# Patient Record
Sex: FEMALE | Race: WHITE | NOT HISPANIC OR LATINO | URBAN - METROPOLITAN AREA
[De-identification: names, ages, dates, MRNs, and addresses within clinical notes are randomized per-mention and may not be internally consistent; named-entity substitution may affect disease eponyms.]

---

## 2018-07-26 ENCOUNTER — OFFICE VISIT (OUTPATIENT)
Dept: URBAN - METROPOLITAN AREA CLINIC 76 | Facility: CLINIC | Age: 68
End: 2018-07-26
Payer: MEDICARE

## 2018-07-26 PROCEDURE — 99213 OFFICE O/P EST LOW 20 MIN: CPT | Performed by: OPTOMETRIST

## 2018-07-26 ASSESSMENT — INTRAOCULAR PRESSURE
OS: 9
OD: 10

## 2018-11-15 ENCOUNTER — OFFICE VISIT (OUTPATIENT)
Dept: URBAN - METROPOLITAN AREA CLINIC 76 | Facility: CLINIC | Age: 68
End: 2018-11-15
Payer: MEDICARE

## 2018-11-15 DIAGNOSIS — H40.1132 PRIMARY OPEN-ANGLE GLAUCOMA, BILATERAL, MODERATE STAGE: Primary | ICD-10-CM

## 2018-11-15 DIAGNOSIS — H35.3131 BILATERAL NONEXUDATIVE AGE-RELATED MACULAR DEGENERATION, EARLY DRY STAGE: ICD-10-CM

## 2018-11-15 PROCEDURE — 92014 COMPRE OPH EXAM EST PT 1/>: CPT | Performed by: OPTOMETRIST

## 2018-11-15 PROCEDURE — 92133 CPTRZD OPH DX IMG PST SGM ON: CPT | Performed by: OPTOMETRIST

## 2018-11-15 PROCEDURE — 92083 EXTENDED VISUAL FIELD XM: CPT | Performed by: OPTOMETRIST

## 2018-11-15 ASSESSMENT — INTRAOCULAR PRESSURE
OD: 14
OS: 11

## 2018-11-15 NOTE — IMPRESSION/PLAN
Impression: Primary open-angle glaucoma, bilateral, moderate stage: Q06.2333. IOP controlled OU. OD borderline. OCT 11/15/18: stable OU. VF 11/15/18: stable OD, mildly worse OS. Plan: Discussed. Continue Latanoprost QHS OU. Repeat VF/OCT 11/2019.

## 2018-11-15 NOTE — IMPRESSION/PLAN
Impression: Bilateral nonexudative age-related macular degeneration, early dry stage: H35.3131. just started AREDS Plan: Discussed Amsler and AREDS. Gave/explained Amsler Grid. Continue to monitor. Call if vision worsens.

## 2019-08-27 ENCOUNTER — OFFICE VISIT (OUTPATIENT)
Dept: URBAN - METROPOLITAN AREA CLINIC 76 | Facility: CLINIC | Age: 69
End: 2019-08-27
Payer: MEDICARE

## 2019-08-27 DIAGNOSIS — G43.B0 OPHTHALMOPLEGIC MIGRAINE, NOT INTRACTABLE: ICD-10-CM

## 2019-08-27 PROCEDURE — 92014 COMPRE OPH EXAM EST PT 1/>: CPT | Performed by: OPTOMETRIST

## 2019-08-27 PROCEDURE — 92134 CPTRZ OPH DX IMG PST SGM RTA: CPT | Performed by: OPTOMETRIST

## 2019-08-27 ASSESSMENT — INTRAOCULAR PRESSURE
OD: 13
OS: 10

## 2019-08-27 NOTE — IMPRESSION/PLAN
Impression: Ophthalmoplegic migraine, not intractable: G43. B0. hx of. Plan: Continue to monitor. Call if worsens in any way. Advise pt have blood pressures and blood sugars monitored if doesn't already know status.

## 2019-08-27 NOTE — IMPRESSION/PLAN
Impression: Primary open-angle glaucoma, bilateral, moderate stage: W16.9059. IOP controlled OU. OD borderline. OCT 11/15/18: stable OU. VF 11/15/18: stable OD, mildly worse OS. Plan: Discussed. Continue Latanoprost QHS OU. Needs updated VF/OCT.

## 2019-08-27 NOTE — IMPRESSION/PLAN
Impression: Bilateral nonexudative age-related macular degeneration, early dry stage: H35.3131. stable. Plan: Discussed condition in detail. AMD and AREDS education given. Amsler grid given and explained how and when to use. Call if symptoms worsen. Will continue to monitor. Yearly mac OCT or sooner PRN. Next due 8/2020.

## 2020-02-20 ENCOUNTER — OFFICE VISIT (OUTPATIENT)
Dept: URBAN - METROPOLITAN AREA CLINIC 76 | Facility: CLINIC | Age: 70
End: 2020-02-20
Payer: MEDICARE

## 2020-02-20 PROCEDURE — 92133 CPTRZD OPH DX IMG PST SGM ON: CPT | Performed by: OPTOMETRIST

## 2020-02-20 PROCEDURE — 92083 EXTENDED VISUAL FIELD XM: CPT | Performed by: OPTOMETRIST

## 2020-02-20 PROCEDURE — 99213 OFFICE O/P EST LOW 20 MIN: CPT | Performed by: OPTOMETRIST

## 2020-02-20 ASSESSMENT — KERATOMETRY
OD: 46.88
OS: 46.50

## 2020-02-20 ASSESSMENT — INTRAOCULAR PRESSURE
OS: 11
OD: 13

## 2020-02-20 NOTE — IMPRESSION/PLAN
Impression: Primary open-angle glaucoma, bilateral, moderate stage: J03.9901. IOP controlled OU. OD borderline. OCT 2/20/20: stable OU. VF 2/20/20: stable OD, sup trinidad mildly worse and inf step fluctuating OS. Hx of multiple episodes of optic neuritis since age 19: unknown eye. Pt didn't return when advised. Plan: Discussed.   Continue Latanoprost QHS OU.  VF/OCT 2/2021

## 2020-06-17 ENCOUNTER — OFFICE VISIT (OUTPATIENT)
Dept: URBAN - METROPOLITAN AREA CLINIC 76 | Facility: CLINIC | Age: 70
End: 2020-06-17
Payer: MEDICARE

## 2020-06-17 PROCEDURE — 99213 OFFICE O/P EST LOW 20 MIN: CPT | Performed by: OPTOMETRIST

## 2020-06-17 ASSESSMENT — INTRAOCULAR PRESSURE
OD: 13
OS: 11

## 2020-06-17 NOTE — IMPRESSION/PLAN
Impression: Primary open-angle glaucoma, bilateral, moderate stage: P65.0739. IOP controlled OU. OD borderline. Target IOP mid-teens. OCT 2/20/20: stable OU. VF 2/20/20: stable OD, sup trinidad mildly worse and inf step fluctuating OS. Hx of multiple episodes of optic neuritis since age 19: unknown eye. Plan: Discussed.   Continue Latanoprost QHS OU.  VF/OCT 2/2021

## 2021-01-19 ENCOUNTER — OFFICE VISIT (OUTPATIENT)
Dept: URBAN - METROPOLITAN AREA CLINIC 76 | Facility: CLINIC | Age: 71
End: 2021-01-19
Payer: MEDICARE

## 2021-01-19 PROCEDURE — 99213 OFFICE O/P EST LOW 20 MIN: CPT | Performed by: OPTOMETRIST

## 2021-01-19 ASSESSMENT — INTRAOCULAR PRESSURE
OS: 11
OD: 12

## 2021-01-19 NOTE — IMPRESSION/PLAN
Impression: Primary open-angle glaucoma, bilateral, moderate stage: P16.3254. IOP controlled OU. OD borderline. Target IOP mid-teens. OCT 2/20/20: stable OU. VF 2/20/20: stable OD, sup trinidad mildly worse and inf step fluctuating OS. Hx of multiple episodes of optic neuritis since age 19: unknown eye. IOP good today OU. Pt did not return when advised. Plan: Discussed.   Continue Latanoprost QHS OU.  VF/OCT 4/2021

## 2021-04-07 ENCOUNTER — OFFICE VISIT (OUTPATIENT)
Dept: URBAN - METROPOLITAN AREA CLINIC 76 | Facility: CLINIC | Age: 71
End: 2021-04-07
Payer: MEDICARE

## 2021-04-07 PROCEDURE — 92083 EXTENDED VISUAL FIELD XM: CPT | Performed by: OPTOMETRIST

## 2021-04-07 PROCEDURE — 92133 CPTRZD OPH DX IMG PST SGM ON: CPT | Performed by: OPTOMETRIST

## 2021-04-07 PROCEDURE — 92014 COMPRE OPH EXAM EST PT 1/>: CPT | Performed by: OPTOMETRIST

## 2021-04-07 ASSESSMENT — INTRAOCULAR PRESSURE
OS: 12
OD: 11

## 2021-04-07 NOTE — IMPRESSION/PLAN
Impression: Bilateral nonexudative age-related macular degeneration, early dry stage: H35.3131. stable. Plan: Discussed condition. AMD/AREDS/Amsler grid reviewed. Call if symptoms worsen. Will continue to monitor. Recommended Mac OCT at next IOP check.

## 2021-04-07 NOTE — IMPRESSION/PLAN
Impression: Primary open-angle glaucoma, bilateral, moderate stage: G41.6896. IOP controlled OU. Target IOP mid-teens. OCT 4/7/21: stable OU. VF 4/7/21: Nasal worse OD, nasal defect stable OS. Hx of multiple episodes of optic neuritis since age 19: unknown eye. IOP good today OU. *Pt was upset about today's wait time. Plan: Discussed.   Continue Latanoprost QHS OU.  VF/OCT 4/2022

## 2021-09-01 ENCOUNTER — OFFICE VISIT (OUTPATIENT)
Dept: URBAN - METROPOLITAN AREA CLINIC 76 | Facility: CLINIC | Age: 71
End: 2021-09-01
Payer: MEDICARE

## 2021-09-01 DIAGNOSIS — Z96.1 PRESENCE OF INTRAOCULAR LENS: ICD-10-CM

## 2021-09-01 PROCEDURE — 92134 CPTRZ OPH DX IMG PST SGM RTA: CPT | Performed by: OPTOMETRIST

## 2021-09-01 PROCEDURE — 99213 OFFICE O/P EST LOW 20 MIN: CPT | Performed by: OPTOMETRIST

## 2021-09-01 ASSESSMENT — INTRAOCULAR PRESSURE
OS: 10
OD: 10

## 2021-09-01 NOTE — IMPRESSION/PLAN
Impression: Bilateral nonexudative age-related macular degeneration, early dry stage: H35.3131. stable. Mac OCT 9/1/21: No SRF/IRF OU. Plan: Discussed condition. AMD/AREDS/Amsler grid reviewed. Call if symptoms worsen. Will continue to monitor. Recommended repeat Mac OCT yearly.

## 2021-09-01 NOTE — IMPRESSION/PLAN
Impression: Primary open-angle glaucoma, bilateral, moderate stage: U71.2138. IOP controlled OU. Target IOP mid-teens. OCT 4/7/21: stable OU. VF 4/7/21: Nasal worse OD, nasal defect stable OS. Hx of multiple episodes of optic neuritis since age 19: unknown eye. IOP good today OU. Plan: Discussed. Continue Latanoprost QHS OU.  repeat VF/OCT 4/2022, intraocular pressure stable OU.

## 2021-11-04 NOTE — IMPRESSION/PLAN
Impression: Primary open-angle glaucoma, bilateral, moderate stage: A95.0719. IOP controlled OU Plan: Discussed. Continue Latanoprost QHS OU. Repeat VF/OCT 11/2019. Patient made aware of 24/7 emergency services.

## 2022-01-18 ENCOUNTER — OFFICE VISIT (OUTPATIENT)
Dept: URBAN - METROPOLITAN AREA CLINIC 76 | Facility: CLINIC | Age: 72
End: 2022-01-18
Payer: MEDICARE

## 2022-01-18 PROCEDURE — 99213 OFFICE O/P EST LOW 20 MIN: CPT | Performed by: OPTOMETRIST

## 2022-01-18 ASSESSMENT — INTRAOCULAR PRESSURE
OD: 11
OS: 11

## 2022-01-18 NOTE — IMPRESSION/PLAN
Impression: Primary open-angle glaucoma, bilateral, moderate stage: I58.3360. Target IOP mid-teens. OCT 4/7/21: stable OU. VF 4/7/21: Nasal worse OD, nasal defect stable OS. Hx of multiple episodes of optic neuritis since age 19: unknown eye. IOP good today OU. Plan: Discussed. Continue Latanoprost QHS OU.  repeat VF/OCT 4/2022.

## 2022-05-04 ENCOUNTER — OFFICE VISIT (OUTPATIENT)
Dept: URBAN - METROPOLITAN AREA CLINIC 76 | Facility: CLINIC | Age: 72
End: 2022-05-04
Payer: MEDICARE

## 2022-05-04 DIAGNOSIS — Z96.1 PRESENCE OF INTRAOCULAR LENS: ICD-10-CM

## 2022-05-04 DIAGNOSIS — H52.4 PRESBYOPIA: ICD-10-CM

## 2022-05-04 DIAGNOSIS — H35.3131 BILATERAL NONEXUDATIVE AGE-RELATED MACULAR DEGENERATION, EARLY DRY STAGE: ICD-10-CM

## 2022-05-04 DIAGNOSIS — H40.1132 PRIMARY OPEN-ANGLE GLAUCOMA, BILATERAL, MODERATE STAGE: Primary | ICD-10-CM

## 2022-05-04 PROCEDURE — 92083 EXTENDED VISUAL FIELD XM: CPT | Performed by: OPTOMETRIST

## 2022-05-04 PROCEDURE — 99214 OFFICE O/P EST MOD 30 MIN: CPT | Performed by: OPTOMETRIST

## 2022-05-04 PROCEDURE — 92133 CPTRZD OPH DX IMG PST SGM ON: CPT | Performed by: OPTOMETRIST

## 2022-05-04 ASSESSMENT — INTRAOCULAR PRESSURE
OS: 9
OD: 11

## 2022-05-04 NOTE — IMPRESSION/PLAN
Impression: Presbyopia: H52.4. Plan: Pt needing new gls but waiting until she can afford them. Advised vision exam cannot be done on medical visits. Pt understands.

## 2022-05-04 NOTE — IMPRESSION/PLAN
Impression: Primary open-angle glaucoma, bilateral, moderate stage: Y34.8084. Target IOP mid-teens. OCT 05/04/22: thinning stable OU. VF 05/04/22: nasal defect stable OU. Hx of multiple episodes of optic neuritis since age 19: unknown eye. IOP good today OU. Plan: Discussed condition. Continue Latanoprost QHS OU. OK to switch to q6 mos visits. Repeat VF/OCT 05/2023.

## 2022-05-04 NOTE — IMPRESSION/PLAN
Impression: Bilateral nonexudative age-related macular degeneration, early dry stage: H35.3131. stable. Mac OCT 9/1/21: No SRF/IRF OU. Plan: Discussed condition. AMD/AREDS/Amsler grid reviewed. Call if symptoms worsen. Will continue to monitor. Recommended repeat Mac OCT 9/2022.

## 2022-11-01 ENCOUNTER — OFFICE VISIT (OUTPATIENT)
Dept: URBAN - METROPOLITAN AREA CLINIC 76 | Facility: CLINIC | Age: 72
End: 2022-11-01
Payer: MEDICARE

## 2022-11-01 DIAGNOSIS — H35.3131 NONEXUDATIVE AGE-RELATED MACULAR DEGENERATION, BILATERAL, EARLY DRY STAGE: Primary | ICD-10-CM

## 2022-11-01 DIAGNOSIS — Z96.1 PRESENCE OF INTRAOCULAR LENS: ICD-10-CM

## 2022-11-01 DIAGNOSIS — H40.1132 PRIMARY OPEN-ANGLE GLAUCOMA, BILATERAL, MODERATE STAGE: ICD-10-CM

## 2022-11-01 DIAGNOSIS — H10.45 OTHER CHRONIC ALLERGIC CONJUNCTIVITIS: ICD-10-CM

## 2022-11-01 PROCEDURE — 99213 OFFICE O/P EST LOW 20 MIN: CPT | Performed by: OPTOMETRIST

## 2022-11-01 PROCEDURE — 92134 CPTRZ OPH DX IMG PST SGM RTA: CPT | Performed by: OPTOMETRIST

## 2022-11-01 ASSESSMENT — INTRAOCULAR PRESSURE
OD: 14
OS: 12

## 2022-11-01 NOTE — IMPRESSION/PLAN
Impression: Bilateral nonexudative age-related macular degeneration, early dry stage: H35.3131. stable. Mac OCT 11/1/22: No SRF/IRF OU. Taking AREDS. Plan: Discussed condition. AMD/AREDS/Amsler grid reviewed. Call if symptoms worsen. Will continue to monitor.  Recommended repeat Mac OCT 11/2023

## 2022-11-01 NOTE — IMPRESSION/PLAN
Impression: Primary open-angle glaucoma, bilateral, moderate stage: F22.3234. Target IOP mid-teens. OCT 05/04/22: thinning stable OU. VF 05/04/22: nasal defect stable OU. Hx of multiple episodes of optic neuritis since age 19: unknown eye. IOP good today OU. Plan: Discussed condition. Continue Latanoprost QHS OU. Continue q6 mos visits. Repeat VF/OCT 05/2023.

## 2022-11-01 NOTE — IMPRESSION/PLAN
Impression: Other chronic allergic conjunctivitis: H10.45. Plan: Discussed. Advise the use of Ketotifen/Olopatadine/Alcaftadine QD-BID OU PRN. Call if symptoms do not resolve or if worsens.

## 2023-08-02 ENCOUNTER — OFFICE VISIT (OUTPATIENT)
Dept: URBAN - METROPOLITAN AREA CLINIC 76 | Facility: CLINIC | Age: 73
End: 2023-08-02
Payer: MEDICARE

## 2023-08-02 DIAGNOSIS — H40.1132 PRIMARY OPEN-ANGLE GLAUCOMA, BILATERAL, MODERATE STAGE: Primary | ICD-10-CM

## 2023-08-02 DIAGNOSIS — Z96.1 PRESENCE OF INTRAOCULAR LENS: ICD-10-CM

## 2023-08-02 DIAGNOSIS — H52.4 PRESBYOPIA: ICD-10-CM

## 2023-08-02 DIAGNOSIS — H53.2 DIPLOPIA: ICD-10-CM

## 2023-08-02 DIAGNOSIS — H35.3131 BILATERAL NONEXUDATIVE AGE-RELATED MACULAR DEGENERATION, EARLY DRY STAGE: ICD-10-CM

## 2023-08-02 PROCEDURE — 92133 CPTRZD OPH DX IMG PST SGM ON: CPT | Performed by: OPTOMETRIST

## 2023-08-02 PROCEDURE — 92083 EXTENDED VISUAL FIELD XM: CPT | Performed by: OPTOMETRIST

## 2023-08-02 PROCEDURE — 92014 COMPRE OPH EXAM EST PT 1/>: CPT | Performed by: OPTOMETRIST

## 2023-08-02 ASSESSMENT — KERATOMETRY
OS: 46.63
OD: 46.13

## 2023-08-02 ASSESSMENT — INTRAOCULAR PRESSURE
OS: 8
OD: 9

## 2024-02-06 ENCOUNTER — OFFICE VISIT (OUTPATIENT)
Dept: URBAN - METROPOLITAN AREA CLINIC 76 | Facility: CLINIC | Age: 74
End: 2024-02-06
Payer: MEDICARE

## 2024-02-06 DIAGNOSIS — H35.3131 BILATERAL NONEXUDATIVE AGE-RELATED MACULAR DEGENERATION, EARLY DRY STAGE: Primary | ICD-10-CM

## 2024-02-06 DIAGNOSIS — H40.1132 PRIMARY OPEN-ANGLE GLAUCOMA, BILATERAL, MODERATE STAGE: ICD-10-CM

## 2024-02-06 PROCEDURE — 92134 CPTRZ OPH DX IMG PST SGM RTA: CPT | Performed by: OPTOMETRIST

## 2024-02-06 PROCEDURE — 99213 OFFICE O/P EST LOW 20 MIN: CPT | Performed by: OPTOMETRIST

## 2024-02-06 ASSESSMENT — INTRAOCULAR PRESSURE
OS: 10
OD: 12

## 2024-09-05 ENCOUNTER — OFFICE VISIT (OUTPATIENT)
Dept: URBAN - METROPOLITAN AREA CLINIC 76 | Facility: CLINIC | Age: 74
End: 2024-09-05
Payer: MEDICARE

## 2024-09-05 DIAGNOSIS — H40.1132 PRIMARY OPEN-ANGLE GLAUCOMA, BILATERAL, MODERATE STAGE: Primary | ICD-10-CM

## 2024-09-05 DIAGNOSIS — H35.3131 BILATERAL NONEXUDATIVE AGE-RELATED MACULAR DEGENERATION, EARLY DRY STAGE: ICD-10-CM

## 2024-09-05 DIAGNOSIS — Z96.1 PRESENCE OF INTRAOCULAR LENS: ICD-10-CM

## 2024-09-05 PROCEDURE — 92133 CPTRZD OPH DX IMG PST SGM ON: CPT | Performed by: OPTOMETRIST

## 2024-09-05 PROCEDURE — 92083 EXTENDED VISUAL FIELD XM: CPT | Performed by: OPTOMETRIST

## 2024-09-05 PROCEDURE — 99214 OFFICE O/P EST MOD 30 MIN: CPT | Performed by: OPTOMETRIST

## 2024-09-05 RX ORDER — LATANOPROST 50 UG/ML
0.005 % SOLUTION OPHTHALMIC
Qty: 7.5 | Refills: 3 | Status: ACTIVE
Start: 2024-09-05

## 2024-09-05 ASSESSMENT — INTRAOCULAR PRESSURE
OD: 14
OS: 12

## 2025-03-05 ENCOUNTER — OFFICE VISIT (OUTPATIENT)
Dept: URBAN - METROPOLITAN AREA CLINIC 76 | Facility: CLINIC | Age: 75
End: 2025-03-05
Payer: MEDICARE

## 2025-03-05 DIAGNOSIS — H40.1132 PRIMARY OPEN-ANGLE GLAUCOMA, BILATERAL, MODERATE STAGE: Primary | ICD-10-CM

## 2025-03-05 DIAGNOSIS — H10.45 OTHER CHRONIC ALLERGIC CONJUNCTIVITIS: ICD-10-CM

## 2025-03-05 DIAGNOSIS — H04.123 DRY EYE SYNDROME OF BILATERAL LACRIMAL GLANDS: ICD-10-CM

## 2025-03-05 DIAGNOSIS — H35.3131 BILATERAL NONEXUDATIVE AGE-RELATED MACULAR DEGENERATION, EARLY DRY STAGE: ICD-10-CM

## 2025-03-05 ASSESSMENT — INTRAOCULAR PRESSURE
OS: 11
OD: 13

## 2025-03-05 ASSESSMENT — KERATOMETRY
OS: 46.50
OD: 45.88